# Patient Record
Sex: MALE | Race: WHITE | NOT HISPANIC OR LATINO | Employment: OTHER | RURAL
[De-identification: names, ages, dates, MRNs, and addresses within clinical notes are randomized per-mention and may not be internally consistent; named-entity substitution may affect disease eponyms.]

---

## 2023-05-15 ENCOUNTER — TELEPHONE (OUTPATIENT)
Dept: CARDIOLOGY | Facility: CLINIC | Age: 83
End: 2023-05-15

## 2023-05-15 NOTE — TELEPHONE ENCOUNTER
Caller: Andrew Sky    Relationship to patient: Self    Best call back number:[phone]    Type of visit: 1 YEAR FOLLOW UP    Requested date: EARLY June  AT [place] LOCATION    If rescheduling, when is the original appointment5-15-23    Additional notes: HUB RESCHEDULED PATIENT 1ST AVAILABLE THAT PATIENT COULD DO WAS IN July IF PATIENT NEEDS TO BE SEEN SOONER PLEASE CALL PATIENT TO RESCHEDULE. PATIENT IS ALSO ON WAIT LIST.    PATIENT'S WIFE ALSO WANTS TO KNOW WHERE THEY CAN ORDER C-PAP SUPPLIES.      PLEASE REACH OUT TO PATIENT

## 2023-05-15 NOTE — TELEPHONE ENCOUNTER
CALLED SPOKE WITH  WIFE, HE STILL HAS GOOD CPAP SUPPLY ORDERWITH [facility] UNTIL 5/16/2023. HIS WIFE IS GOING TO CALL AND SEE IF SHE CAN GET SUPPLIES UNTIL FOLLOW UP IN July, IF NOT WILL CALL US BACK.

## 2023-07-25 ENCOUNTER — OFFICE VISIT (OUTPATIENT)
Dept: CARDIOLOGY | Facility: CLINIC | Age: 83
End: 2023-07-25
Payer: MEDICARE

## 2023-07-25 VITALS
BODY MASS INDEX: 25.6 KG/M2 | OXYGEN SATURATION: 96 % | DIASTOLIC BLOOD PRESSURE: 64 MMHG | HEART RATE: 75 BPM | SYSTOLIC BLOOD PRESSURE: 124 MMHG | HEIGHT: 72 IN | WEIGHT: 189 LBS

## 2023-07-25 DIAGNOSIS — I10 PRIMARY HYPERTENSION: ICD-10-CM

## 2023-07-25 DIAGNOSIS — G47.33 OSA (OBSTRUCTIVE SLEEP APNEA): Primary | ICD-10-CM

## 2023-07-25 PROBLEM — I25.10 CORONARY ARTERY DISEASE INVOLVING NATIVE HEART WITHOUT ANGINA PECTORIS: Status: ACTIVE | Noted: 2023-07-25

## 2023-07-25 PROBLEM — I25.10 CORONARY ARTERY DISEASE INVOLVING NATIVE HEART WITHOUT ANGINA PECTORIS: Status: RESOLVED | Noted: 2023-07-25 | Resolved: 2023-07-25

## 2023-07-25 PROCEDURE — 1159F MED LIST DOCD IN RCRD: CPT | Performed by: NURSE PRACTITIONER

## 2023-07-25 PROCEDURE — 3078F DIAST BP <80 MM HG: CPT | Performed by: NURSE PRACTITIONER

## 2023-07-25 PROCEDURE — 3074F SYST BP LT 130 MM HG: CPT | Performed by: NURSE PRACTITIONER

## 2023-07-25 PROCEDURE — 1160F RVW MEDS BY RX/DR IN RCRD: CPT | Performed by: NURSE PRACTITIONER

## 2023-07-25 PROCEDURE — 99214 OFFICE O/P EST MOD 30 MIN: CPT | Performed by: NURSE PRACTITIONER

## 2023-07-25 RX ORDER — ATORVASTATIN CALCIUM 40 MG/1
40 TABLET, FILM COATED ORAL DAILY
COMMUNITY

## 2023-07-25 RX ORDER — METOPROLOL TARTRATE 100 MG/1
100 TABLET ORAL 2 TIMES DAILY
COMMUNITY

## 2023-07-25 RX ORDER — CYCLOBENZAPRINE HCL 5 MG
5 TABLET ORAL
COMMUNITY
Start: 2023-06-27 | End: 2023-07-25 | Stop reason: HOSPADM

## 2023-07-25 RX ORDER — LANOLIN ALCOHOL/MO/W.PET/CERES
CREAM (GRAM) TOPICAL
COMMUNITY
Start: 2023-07-10

## 2023-07-25 RX ORDER — WARFARIN SODIUM 2 MG/1
2 TABLET ORAL NIGHTLY
COMMUNITY

## 2023-07-25 RX ORDER — TRAMADOL HYDROCHLORIDE 50 MG/1
50 TABLET ORAL
COMMUNITY

## 2023-07-25 RX ORDER — ISOSORBIDE MONONITRATE 30 MG/1
30 TABLET, EXTENDED RELEASE ORAL DAILY
COMMUNITY

## 2023-07-25 RX ORDER — LOPERAMIDE HYDROCHLORIDE 2 MG/1
CAPSULE ORAL
COMMUNITY
Start: 2023-05-23 | End: 2023-07-25 | Stop reason: HOSPADM

## 2023-07-25 RX ORDER — NITROGLYCERIN 0.4 MG/1
TABLET SUBLINGUAL
COMMUNITY

## 2023-07-25 RX ORDER — ONDANSETRON 4 MG/1
4 TABLET, ORALLY DISINTEGRATING ORAL EVERY 8 HOURS PRN
COMMUNITY
Start: 2023-05-23

## 2023-07-25 RX ORDER — MONTELUKAST SODIUM 10 MG/1
TABLET ORAL
COMMUNITY
End: 2023-07-25 | Stop reason: HOSPADM

## 2023-07-25 RX ORDER — TAMSULOSIN HYDROCHLORIDE 0.4 MG/1
0.4 CAPSULE ORAL DAILY
COMMUNITY

## 2023-07-25 RX ORDER — DOXAZOSIN MESYLATE 4 MG/1
4 TABLET ORAL NIGHTLY
COMMUNITY

## 2023-07-25 RX ORDER — PREDNISONE 20 MG/1
TABLET ORAL
COMMUNITY
Start: 2023-07-20 | End: 2023-07-25 | Stop reason: HOSPADM

## 2023-07-25 RX ORDER — MECLIZINE HCL 12.5 MG/1
TABLET ORAL
COMMUNITY
Start: 2023-06-09

## 2023-07-25 RX ORDER — LORAZEPAM 1 MG/1
TABLET ORAL
COMMUNITY
Start: 2023-06-27

## 2023-07-25 RX ORDER — DONEPEZIL HYDROCHLORIDE 10 MG/1
10 TABLET, FILM COATED ORAL NIGHTLY
COMMUNITY

## 2023-07-25 NOTE — PROGRESS NOTES
Follow-Up Sleep Consult     Date:   2023  Name: Andrew Sky  :   1940  PCP: Lakhwinder Francis MD    Chief Complaint   Patient presents with    Sleep Apnea       Subjective     History of Present Illness  Andrew Sky is a 82 y.o. male who presents today for follow-up on SERAFIN.He is here today for annual follow up.     He reports that his long history of tinnitus interrupts his sleep.  He reports that he is tired most of the time.  He reports that he did go golfing this week.    He did not bring his chip in for download today.  We contacted his DME for download but they did not have any new data since 2022.  It is noted that his current PAP device is approaching 5 years old.    He reports he is a faithful user of his PAP device that he uses it every night.    Sleep history:  SERAFIN AHI 15 on 2012  Titration study 6/15/2012 to BiPAP   Current SERAFIN therapy auto BiPAP     Coexisting conditions: Hypertension, CAD, EF 35%, moderate aortic stenosis.  Follows with cardiologist Dr. Martinez      Current mask used is FFM     Device Functioning Well: Yes  Mask Fit Comfortable: Yes  Air Flow Comfortable: Yes  DME Helpful for Supplies: Yes  Sleep is rested: No, Bathroom trips interrupt sleep , Frequent awakenings , and light sleeper due to the tinnitus. He reports that he is rested some. He is tired. But he does not take a nap.         Device Download:           The patient's relevant past medical, surgical, family, and social history reviewed and updated in Epic as appropriate.    Past Medical History:   Diagnosis Date    Atrial fibrillation     Coronary artery disease     SERAFIN (obstructive sleep apnea)      Past Surgical History:   Procedure Laterality Date    PACEMAKER IMPLANTATION         No Known Allergies  Prior to Admission medications    Medication Sig Start Date End Date Taking? Authorizing Provider   atorvastatin (LIPITOR) 40 MG tablet Take 1 tablet by mouth Daily.   Yes Provider,  MD Maury   cyanocobalamin (VITAMIN B-12) 500 MCG tablet Take 1 tablet by mouth Daily.   Yes ProviderMaury MD   donepezil (ARICEPT) 10 MG tablet Take 1 tablet by mouth Every Night.   Yes ProviderMaury MD   doxazosin (CARDURA) 4 MG tablet Take 1 tablet by mouth Every Night.   Yes Maury Alfred MD   isosorbide mononitrate (IMDUR) 30 MG 24 hr tablet Take 1 tablet by mouth Daily.   Yes Maury Alfred MD   LORazepam (ATIVAN) 1 MG tablet  6/27/23  Yes Maury Alfred MD   meclizine (ANTIVERT) 12.5 MG tablet  6/9/23  Yes Maury Alfred MD   melatonin 3 MG tablet  7/10/23  Yes Maury Alfred MD   metoprolol tartrate (LOPRESSOR) 100 MG tablet Take 1 tablet by mouth 2 (Two) Times a Day.   Yes Maury Alfred MD   nitroglycerin (NITROSTAT) 0.4 MG SL tablet nitroglycerin 0.4 mg sublingual tablet   Yes Maury Alfred MD   ondansetron ODT (ZOFRAN-ODT) 4 MG disintegrating tablet 1 tablet Every 8 (Eight) Hours As Needed. 5/23/23  Yes Maury Alfred MD   tamsulosin (FLOMAX) 0.4 MG capsule 24 hr capsule Take 1 capsule by mouth Daily.   Yes ProviderMaury MD   traMADol (ULTRAM) 50 MG tablet Take 1 tablet by mouth.   Yes ProviderMaury MD   warfarin (COUMADIN) 2 MG tablet Take 1 tablet by mouth Every Night.   Yes Maury Alfred MD   cyclobenzaprine (FLEXERIL) 5 MG tablet Take 1 tablet by mouth.  Patient not taking: Reported on 7/25/2023 6/27/23 7/25/23  Maury Alfred MD   loperamide (IMODIUM) 2 MG capsule  5/23/23 7/25/23  Maury Alfred MD   montelukast (SINGULAIR) 10 MG tablet montelukast 10 mg tablet  Patient not taking: Reported on 7/25/2023 7/25/23  Maury Alfred MD   predniSONE (DELTASONE) 20 MG tablet  7/20/23 7/25/23  Maury Alfred MD     History reviewed. No pertinent family history.    Objective     Vital Signs:  /64 (BP Location: Left arm, Patient Position: Sitting)   Pulse 75   Ht 182.9 cm  "(72\")   Wt 85.7 kg (189 lb)   SpO2 96%   BMI 25.63 kg/m²     BMI is >= 25 and <30. (Overweight) The following options were offered after discussion;: referral to primary care        Physical Exam  HENT:      Head: Normocephalic.      Nose: Nose normal.      Mouth/Throat:      Mouth: Mucous membranes are moist.   Pulmonary:      Effort: Pulmonary effort is normal.   Skin:     General: Skin is warm and dry.   Neurological:      Mental Status: He is alert and oriented to person, place, and time.   Psychiatric:         Mood and Affect: Mood normal.         Behavior: Behavior normal.         Thought Content: Thought content normal.           PAP download reviewed: October and November 2022.  30-day download.  I have reviewed and interpreted the data on the download.  and cardiology note dated 4/18/2023         Assessment and Plan     Diagnoses and all orders for this visit:    1. SERAFIN (obstructive sleep apnea) (Primary)  Assessment & Plan:  Baseline AHI 15.  This is moderate sleep apnea.  He is on BiPAP therapy.  He did not bring in his chip today and his [employer] company was unable to provide a more recent download but I did review a download from October and November 2022.    Download reviewed showing good control and good compliance.  He is benefiting from BiPAP therapy with plan to continue BiPAP therapy.    He reports he is a faithful user of his PAP therapy every night.    Noted that his PAP therapy is nearing the age of 5 years.  We will plan to replace this device.    Plan: Prescription for new auto BiPAP including the pressures 22/16 PS 4 and BiPAP supplies to the DME of his choice.  We will see him back in about 2 months for a compliance download.    Orders:  -     PAP Therapy    2. Primary hypertension  Assessment & Plan:  This is a coexisting condition with his moderate sleep apnea.    Blood pressure check is within normal limits at today's visit.  He is encouraged to continue his current blood pressure " medication regimen, continue to follow-up with his prescribing provider.    Untreated sleep apnea may potentiate hypertension and other cardiac conditions such as CAD and CHF.          Report if any new/changing symptoms immediately         Follow Up  Return in about 3 months (around 10/25/2023) for replace  BIPAP/31-90 day visit .  Patient was given instructions and counseling regarding his condition or for health maintenance advice. Please see specific information pulled into the AVS if appropriate.

## 2023-07-25 NOTE — ASSESSMENT & PLAN NOTE
Baseline AHI 15.  This is moderate sleep apnea.  He is on BiPAP therapy.  He did not bring in his chip today and his DME company was unable to provide a more recent download but I did review a download from October and November 2022.    Download reviewed showing good control and good compliance.  He is benefiting from BiPAP therapy with plan to continue BiPAP therapy.    He reports he is a faithful user of his PAP therapy every night.    Noted that his PAP therapy is nearing the age of 5 years.  We will plan to replace this device.    Plan: Prescription for new auto BiPAP including the pressures 22/16 PS 4 and BiPAP supplies to the DME of his choice.  We will see him back in about 2 months for a compliance download.

## 2023-07-25 NOTE — ASSESSMENT & PLAN NOTE
This is a coexisting condition with his moderate sleep apnea.    Blood pressure check is within normal limits at today's visit.  He is encouraged to continue his current blood pressure medication regimen, continue to follow-up with his prescribing provider.    Untreated sleep apnea may potentiate hypertension and other cardiac conditions such as CAD and CHF.

## 2023-08-23 ENCOUNTER — TELEPHONE (OUTPATIENT)
Dept: CARDIOLOGY | Facility: CLINIC | Age: 83
End: 2023-08-23
Payer: MEDICARE

## 2023-08-23 NOTE — TELEPHONE ENCOUNTER
Spoke with pt's wife who wanted order from 7/21/2023 faxed to [facility] in [facility].  Refaxed orders and called [facility] to alert staff if they have not received orders.  Both pt's wife and [facility] verbalize understanding.

## 2023-08-23 NOTE — TELEPHONE ENCOUNTER
Caller: Andrew Sky    Relationship: Self    Best call back number: [phone]    What is the best time to reach you: ANY    Who are you requesting to speak with (clinical staff, provider,  specific staff member): CLINICAL    What was the call regarding: PTS WIFE CALLED IN STATING THAT PATIENT IS NEEDING A RX FOR A CPAP MACHINE ALONG WITH THE ACCESSORIES THAT IS NEEDED. PT IS WONDERING IF THERE IS A PHARMACY THAT THEY COULD PICK THE MACHINE UP IN [place] IF NOT, CAN SEND TO PHARMACY [address] IN [place]. [phone].     Is it okay if the provider responds through MyChart: NO

## 2023-08-24 ENCOUNTER — TELEPHONE (OUTPATIENT)
Dept: CARDIOLOGY | Facility: CLINIC | Age: 83
End: 2023-08-24

## 2023-08-24 NOTE — TELEPHONE ENCOUNTER
Caller: AGUS LANDA    Relationship: Spouse    Best call back number: [phone]     What is the best time to reach you: ASAP ANYTIME     What was the call regarding: PT IS TRYING TO GET A NEW CPAP AND THEY TOLD HIM THAT THEY NEEDED TO KNOW WHERE HE GOT HIS LAST SLEEP STUDY DONE AND HIS WIFE DOES NOT REMEMBER. PLEASE ADVISE.

## 2023-09-12 ENCOUNTER — TELEPHONE (OUTPATIENT)
Dept: CARDIOLOGY | Facility: CLINIC | Age: 83
End: 2023-09-12
Payer: MEDICARE

## 2023-09-12 NOTE — TELEPHONE ENCOUNTER
[employer] messaged me stating that the patient refused his bipap as he stated his copay was too high.  He would not allow [facility] to offer a payment plan of any sort.  Just wanted to document this.  Thanks

## 2023-11-01 ENCOUNTER — OFFICE VISIT (OUTPATIENT)
Dept: CARDIOLOGY | Facility: CLINIC | Age: 83
End: 2023-11-01
Payer: MEDICARE

## 2023-11-01 VITALS
HEART RATE: 74 BPM | WEIGHT: 200 LBS | HEIGHT: 72 IN | DIASTOLIC BLOOD PRESSURE: 60 MMHG | OXYGEN SATURATION: 97 % | BODY MASS INDEX: 27.09 KG/M2 | SYSTOLIC BLOOD PRESSURE: 122 MMHG

## 2023-11-01 DIAGNOSIS — G47.33 OSA (OBSTRUCTIVE SLEEP APNEA): Primary | ICD-10-CM

## 2023-11-01 RX ORDER — ATORVASTATIN CALCIUM 20 MG/1
TABLET, FILM COATED ORAL
COMMUNITY
Start: 2023-09-28

## 2023-11-01 RX ORDER — CETIRIZINE HYDROCHLORIDE 10 MG/1
10 TABLET ORAL DAILY
COMMUNITY

## 2023-11-01 NOTE — ASSESSMENT & PLAN NOTE
Baseline AHI is 15.  This is moderate sleep apnea.    He is on BiPAP therapy.  This is his first visit back on a new BiPAP.  His old BiPAP was greater than 5 years old and was replaced.  Download is reviewed with good control and good compliance.    He is benefiting from PAP therapy and we plan to continue PAP therapy.    Prescription for supply order to the DME of his choice.    Plan follow-up in the sleep clinic in 1 year or sooner for any SERAFIN or PAP concerns.

## 2023-11-01 NOTE — PROGRESS NOTES
Follow-Up Sleep Consult     Date:   2023  Name: Andrew Sky  :   1940  PCP: Lakhwinder Francis MD    Chief Complaint   Patient presents with    Sleep Apnea       Subjective     History of Present Illness  Andrew Sky is a 83 y.o. male who presents today for follow-up on SERAFIN.  He has a known history of moderate sleep apnea on BiPAP therapy.    At his last visit his PAP device was found to be more than 5 years old and he wished to have it replaced.    Today he presents to the clinic for a follow-up visit on new BiPAP.  He reports he has had the device more than 1 month but less than 3.  He reports he is very satisfied with the device and the function and he has no sleep or Pap concerns.    Sleep history:  SERAFIN AHI 15 on 2012  Titration study 6/15/2012 to BiPAP   Current SERAFIN therapy auto BiPAP     Coexisting conditions: Hypertension, CAD, EF 35%, moderate aortic stenosis.  Follows with cardiologist Dr. Martinez      Current mask used is FFM    Device Functioning Well: Yes  Mask Fit Comfortable: Yes  Air Flow Comfortable: Yes  DME Helpful for Supplies: Yes  Sleep is rested: Yes        Device Download:                     The patient's relevant past medical, surgical, family, and social history reviewed and updated in Epic as appropriate.    Past Medical History:   Diagnosis Date    Atrial fibrillation     Coronary artery disease     SERAFIN (obstructive sleep apnea)      Past Surgical History:   Procedure Laterality Date    PACEMAKER IMPLANTATION         No Known Allergies  Prior to Admission medications    Medication Sig Start Date End Date Taking? Authorizing Provider   atorvastatin (LIPITOR) 20 MG tablet  23  Yes ProviderMaury MD   cetirizine (zyrTEC) 10 MG tablet Take 1 tablet by mouth Daily.   Yes Provider, MD Maury   cyanocobalamin (VITAMIN B-12) 500 MCG tablet Take 1 tablet by mouth Daily.   Yes ProviderMaury MD   donepezil (ARICEPT) 10 MG tablet Take 1  "tablet by mouth Every Night.   Yes Maury Alfred MD   doxazosin (CARDURA) 4 MG tablet Take 1 tablet by mouth Every Night.   Yes Maury Alfred MD   isosorbide mononitrate (IMDUR) 30 MG 24 hr tablet Take 1 tablet by mouth Daily.   Yes Maury Alfred MD   LORazepam (ATIVAN) 1 MG tablet  6/27/23  Yes Maury Alfred MD   meclizine (ANTIVERT) 12.5 MG tablet  6/9/23  Yes Maury Alfred MD   melatonin 3 MG tablet  7/10/23  Yes Maury Alfred MD   metoprolol tartrate (LOPRESSOR) 100 MG tablet Take 1 tablet by mouth 2 (Two) Times a Day.   Yes Maury Alfred MD   nitroglycerin (NITROSTAT) 0.4 MG SL tablet nitroglycerin 0.4 mg sublingual tablet   Yes Maury Alfred MD   ondansetron ODT (ZOFRAN-ODT) 4 MG disintegrating tablet 1 tablet Every 8 (Eight) Hours As Needed. 5/23/23  Yes Maury Alfred MD   tamsulosin (FLOMAX) 0.4 MG capsule 24 hr capsule Take 1 capsule by mouth Daily.   Yes Maury Alfred MD   traMADol (ULTRAM) 50 MG tablet Take 1 tablet by mouth.   Yes Maury Alfred MD   warfarin (COUMADIN) 2 MG tablet Take 1 tablet by mouth Every Night.   Yes Maury Alfred MD   atorvastatin (LIPITOR) 40 MG tablet Take 1 tablet by mouth Daily.  11/1/23  Maury Alfred MD     History reviewed. No pertinent family history.    Objective     Vital Signs:  /60   Pulse 74   Ht 182.9 cm (72\")   Wt 90.7 kg (200 lb)   SpO2 97%   BMI 27.12 kg/m²              Physical Exam  HENT:      Head: Normocephalic.      Nose: Nose normal.      Mouth/Throat:      Mouth: Mucous membranes are moist.   Pulmonary:      Effort: Pulmonary effort is normal.   Skin:     General: Skin is warm and dry.   Neurological:      Mental Status: He is alert and oriented to person, place, and time.   Psychiatric:         Mood and Affect: Mood normal.         Behavior: Behavior normal.         Thought Content: Thought content normal.         The following data was " reviewed by: CARLOS Giron on 11/01/2023:    PAP download reviewed: 10/2/2023 through 10/31/2023.  30-day download.  I have reviewed and interpreted the data on the download.         Assessment and Plan     Diagnoses and all orders for this visit:    1. SERAFIN (obstructive sleep apnea) (Primary)  Assessment & Plan:  Baseline AHI is 15.  This is moderate sleep apnea.    He is on BiPAP therapy.  This is his first visit back on a new BiPAP.  His old BiPAP was greater than 5 years old and was replaced.  Download is reviewed with good control and good compliance.    He is benefiting from PAP therapy and we plan to continue PAP therapy.    Prescription for supply order to the DME of his choice.    Plan follow-up in the sleep clinic in 1 year or sooner for any SERAFIN or PAP concerns.    Orders:  -     PAP Therapy        Report if any new/changing symptoms immediately         Follow Up  Return in about 1 year (around 11/1/2024) for SERAFIN.  Patient was given instructions and counseling regarding his condition or for health maintenance advice. Please see specific information pulled into the AVS if appropriate.

## 2024-11-18 ENCOUNTER — TRANSCRIBE ORDERS (OUTPATIENT)
Dept: ADMINISTRATIVE | Facility: HOSPITAL | Age: 84
End: 2024-11-18
Payer: MEDICARE

## 2024-11-18 DIAGNOSIS — C20 MALIGNANT NEOPLASM OF RECTUM: Primary | ICD-10-CM
